# Patient Record
(demographics unavailable — no encounter records)

---

## 2021-08-09 NOTE — EMERGENCY DEPARTMENT REPORT
ED Back Pain/Injury HPI





- General


Chief Complaint: Back Pain/Injury


Stated Complaint: SEVERE BACK PAIN


Time Seen by Provider: 08/09/21 20:14


Source: patient


Limitations: No Limitations





- History of Present Illness


Initial Comments: 





This is a 17-year-old male nontoxic, well nourished in appearance, no acute 

signs of distress presents to the ED with c/o of acute on chronic lower back 

pain.  Patient stated that he started to practice soccer playing and developed 

pains.  Patient denies any radiation of pains.  Patient denies any injuries or 

trauma.  Denies any bladder or bowel instability.  Patient denies any urinary 

symptoms.  Denies any fever, chills, nausea, vomiting, headache, stiff neck, 

chest pain or shortness of breath.  Patient denies any numbness or tingling.  

Denies any allergies.  Denies significant past medical history.





[employer] translation services from [employer] has been used throughout the whole 

ED stay.


MD Complaint: back pain


-: days(s)


Similar Symptoms Previously: Yes


Radiation: none


Severity: mild


Severity scale (0 -10): 3


Quality: aching


Consistency: intermittent


Improves With: none


Worsens With: immobilization, sitting upright


Context: while lifting, turning/twisting


Associated Symptoms: denies other symptoms.  denies: confusion, weakness, chest 

pain, numbness, difficulty walking, cough, difficulty urinating, diaphoresis, 

incontinence, fever/chills, constipation, headaches, abdominal pain, loss of 

appetite, malaise, nausea/vomiting, rash, seizure, shortness of breath, syncope





- Related Data


                                  Previous Rx's











 Medication  Instructions  Recorded  Last Taken  Type


 


Ibuprofen [Motrin] 600 mg PO Q8H PRN #20 tablet 05/13/18 Unknown Rx


 


cephALEXin [Keflex] 500 mg PO Q12HR #14 cap 05/13/18 Unknown Rx


 


Cyclobenzaprine [Flexeril] 10 mg PO QHS PRN #10 tablet 08/09/21 Unknown Rx


 


Naproxen 500 mg PO Q12H PRN #12 tablet 08/09/21 Unknown Rx











                                    Allergies











Allergy/AdvReac Type Severity Reaction Status Date / Time


 


No Known Allergies Allergy   Verified 05/23/18 11:17














ED Review of Systems


ROS: 


Stated complaint: SEVERE BACK PAIN


Other details as noted in HPI





Comment: All other systems reviewed and negative


Constitutional: denies: chills, fever


Eyes: denies: eye pain, eye discharge, vision change


ENT: denies: ear pain, throat pain


Respiratory: denies: cough, shortness of breath, wheezing


Cardiovascular: denies: chest pain, palpitations


Endocrine: no symptoms reported


Gastrointestinal: denies: abdominal pain, nausea, diarrhea


Genitourinary: denies: urgency, dysuria


Musculoskeletal: back pain.  denies: joint swelling, arthralgia


Skin: denies: rash, lesions


Neurological: denies: headache, weakness, paresthesias


Psychiatric: denies: anxiety, depression


Hematological/Lymphatic: denies: easy bleeding, easy bruising





ED Past Medical Hx





- Past Medical History


Previous Medical History?: No





- Social History


Smoking Status: Never Smoker


Substance Use Type: None





- Medications


Home Medications: 


                                Home Medications











 Medication  Instructions  Recorded  Confirmed  Last Taken  Type


 


Ibuprofen [Motrin] 600 mg PO Q8H PRN #20 tablet 05/13/18  Unknown Rx


 


cephALEXin [Keflex] 500 mg PO Q12HR #14 cap 05/13/18  Unknown Rx


 


Cyclobenzaprine [Flexeril] 10 mg PO QHS PRN #10 tablet 08/09/21  Unknown Rx


 


Naproxen 500 mg PO Q12H PRN #12 tablet 08/09/21  Unknown Rx














ED Physical Exam





- General


Limitations: No Limitations


General appearance: alert, in no apparent distress





- Head


Head exam: Present: atraumatic, normocephalic





- Eye


Eye exam: Present: normal appearance





- Neck


Neck exam: Present: normal inspection, full ROM.  Absent: lymphadenopathy





- Respiratory


Respiratory exam: Present: normal lung sounds bilaterally.  Absent: respiratory 

distress, wheezes, rales, rhonchi, stridor, chest wall tenderness, accessory 

muscle use, decreased breath sounds, prolonged expiratory





- Cardiovascular


Cardiovascular Exam: Present: regular rate, normal rhythm, normal heart sounds. 

 Absent: bradycardia, tachycardia, irregular rhythm





- GI/Abdominal


GI/Abdominal exam: Present: soft.  Absent: distended, tenderness





- Extremities Exam


Extremities exam: Present: normal inspection, full ROM





- Back Exam


Back exam: Present: normal inspection, full ROM.  Absent: tenderness, CVA 

tenderness (R), CVA tenderness (L), muscle spasm, paraspinal tenderness, 

vertebral tenderness, rash noted





- Neurological Exam


Neurological exam: Present: alert, oriented X3, normal gait





- Psychiatric


Psychiatric exam: Present: normal affect, normal mood





- Skin


Skin exam: Present: warm, dry, intact, normal color.  Absent: rash





ED Course


                                   Vital Signs











  08/09/21





  16:10


 


Temperature 98.4 F


 


Pulse Rate 52 L


 


Respiratory 18





Rate 


 


Blood Pressure 116/83





[Right] 


 


O2 Sat by Pulse 99





Oximetry 














- Reevaluation(s)


Reevaluation #1: 





08/09/21 21:07


Patient is speaking in full sentences with no signs of distress noted.





ED Medical Decision Making





- Medical Decision Making





This is a 17-year-old male that presents with low back strain.  Patient is 

stable was examined by me.  There is no spinal tenderness.  There is no cauda 

equina syndrome during examination.  No bladder or bowel instability. Patient 

received Toradol 60 mg IM and prednisone in the ED which stated his symptoms has

 resolved and subsided.  Patient is discharged with muscle relaxant and Motrin. 

 Patient was instructed not to operate any machinery while taking muscle 

relaxant as they cause her drowsiness.  Patient was referred to Follow-up with a

 primary care doctor in 3-5 days or if symptoms worsen and continue return to 

emergency room as soon as possible.  At time of discharge, the patient does not 

seem toxic or ill in appearance.  No acute signs of distress noted.  Patient 

agrees to discharge treatment plan of care.  No further questions noted by the 

patient.





This chart is dictated with using [employer]








[employer] translation services from [employer] has been used throughout the whole 

ED stay, physical exam, and discharge instructions.


Critical care attestation.: 


If time is entered above; I have spent that time in minutes in the direct care 

of this critically ill patient, excluding procedure time.








ED Disposition


Clinical Impression: 


Low back strain


Qualifiers:


 Encounter type: initial encounter Qualified Code(s): S39.012A - Strain of 

muscle, fascia and tendon of lower back, initial encounter





Disposition: DC-01 TO HOME OR SELFCARE


Is pt being admited?: No


Does the pt Need Aspirin: No


Condition: Stable


Instructions:  Lumbar Strain, Cyclobenzaprine tablets


Additional Instructions: 


Follow-up with your primary care doctor in 3-5 days or if symptoms worsen such 

as bladder or bowel stability, chest pain, short of breath, numbness or tingling

 sensation in extremities, headache, dizziness, visual changes, nausea vomiting,

 or abdominal pain, return back to emergency room as was possible.





Take naproxen and Flexeril as prescribed.  Do not operate heavy machinery while 

taking Flexeril due to sedation


Prescriptions: 


Cyclobenzaprine [Flexeril] 10 mg PO QHS PRN #10 tablet


 PRN Reason: Muscle Spasm


Naproxen 500 mg PO Q12H PRN #12 tablet


 PRN Reason: Pain , Severe (7-10)


Referrals: 


[facility] [Primary Care Provider] - 3-5 Days


[facility],MD [Referring] - 3-5 Days


PRIYANKA HIGH MD [Staff Physician] - 3-5 Days


Forms:  Work/School Release Form(ED)


Time of Disposition: 21:14